# Patient Record
Sex: MALE | Race: WHITE | Employment: FULL TIME | URBAN - METROPOLITAN AREA
[De-identification: names, ages, dates, MRNs, and addresses within clinical notes are randomized per-mention and may not be internally consistent; named-entity substitution may affect disease eponyms.]

---

## 2020-01-01 ENCOUNTER — HOSPITAL ENCOUNTER (EMERGENCY)
Age: 50
End: 2020-02-01
Attending: EMERGENCY MEDICINE
Payer: COMMERCIAL

## 2020-01-01 PROCEDURE — 96375 TX/PRO/DX INJ NEW DRUG ADDON: CPT

## 2020-01-01 PROCEDURE — 6360000002 HC RX W HCPCS

## 2020-01-01 PROCEDURE — 2500000003 HC RX 250 WO HCPCS: Performed by: EMERGENCY MEDICINE

## 2020-01-01 PROCEDURE — 6360000002 HC RX W HCPCS: Performed by: EMERGENCY MEDICINE

## 2020-01-01 PROCEDURE — 99291 CRITICAL CARE FIRST HOUR: CPT

## 2020-01-01 PROCEDURE — 2500000003 HC RX 250 WO HCPCS

## 2020-01-01 PROCEDURE — 96374 THER/PROPH/DIAG INJ IV PUSH: CPT

## 2020-01-01 RX ORDER — CALCIUM CHLORIDE 100 MG/ML
INJECTION INTRAVENOUS; INTRAVENTRICULAR DAILY PRN
Status: COMPLETED | OUTPATIENT
Start: 2020-01-01 | End: 2020-01-01

## 2020-01-01 RX ADMIN — EPINEPHRINE 1 MG: 0.1 INJECTION, SOLUTION ENDOTRACHEAL; INTRACARDIAC; INTRAVENOUS at 06:23

## 2020-01-01 RX ADMIN — EPINEPHRINE 1 MG: 0.1 INJECTION, SOLUTION ENDOTRACHEAL; INTRACARDIAC; INTRAVENOUS at 06:20

## 2020-01-01 RX ADMIN — CALCIUM CHLORIDE 1 G: 100 INJECTION, SOLUTION INTRAVENOUS; INTRAVENTRICULAR at 06:21

## 2020-02-01 NOTE — ED TRIAGE NOTES
Attempted to page Dr. Paulo Cain to sign death certificate. Page x3. No call back. Called corners office to inform them. Patient will be transported to the [facility] [place].

## 2020-02-01 NOTE — ED PROVIDER NOTES
Comments: unresponsive         Diagnostic Results     EKG       RADIOLOGY:  No orders to display       LABS:   No results found for this visit on 02/01/20. ED BEDSIDE ULTRASOUND:  No cardiac activity noted on bedside ultrasound    RECENT VITALS:   , , Pulse: (!) 0,  ,       Procedures         ED Course     Nursing Notes, Past Medical Hx, Past Surgical Hx, Social Hx,Allergies, and Family Hx were reviewed. patient was given the following medications:  Orders Placed This Encounter   Medications    EPINEPHrine PF 1 MG/10ML injection    calcium chloride 10 % injection       CONSULTS:  None    MEDICAL DECISIONMAKING / ASSESSMENT / PLAN     Emmalene Blizzard is a 52 y.o. male who presents in cardiac arrest.  Has a significant history of advanced cancer. Resuscitative efforts prior to coming to the emergency department of been ongoing for approximately 1 hour. On arrival the CPR was continued as well as dosing of epinephrine IV. He was also given calcium chloride. Confirmation of the ET tube was performed with both breath sounds and with waveform capnography which is reading below 10 throughout the patient's resuscitative efforts. Reza device was placed on the patient's chest for chest compressions. On bedside ultrasound I am unable to appreciate any cardiac activity with what appears to be an agonal electrical rhythm, essentially asystole noted on the monitor. Brief the patient's family on these findings, and they were able to visit the patient during resuscitative efforts, prior to termination of resuscitation after exhaustion of all practical means, and the patient was pronounced dead in the emergency department at [id number]. Critical Care:  Due to the immediate potential for life-threatening deterioration due to cardiac arrest, I spent 37 minutes providing critical care.   Thistime excludes time spent performing procedures but includes time spent on direct patient care, history retrieval, review of the chart, and